# Patient Record
Sex: MALE | Race: BLACK OR AFRICAN AMERICAN | Employment: FULL TIME | URBAN - METROPOLITAN AREA
[De-identification: names, ages, dates, MRNs, and addresses within clinical notes are randomized per-mention and may not be internally consistent; named-entity substitution may affect disease eponyms.]

---

## 2021-06-12 ENCOUNTER — HOSPITAL ENCOUNTER (EMERGENCY)
Age: 26
Discharge: LEFT BEFORE DISCHARGE | End: 2021-06-12

## 2021-06-12 VITALS
RESPIRATION RATE: 19 BRPM | DIASTOLIC BLOOD PRESSURE: 90 MMHG | TEMPERATURE: 98.5 F | SYSTOLIC BLOOD PRESSURE: 140 MMHG | OXYGEN SATURATION: 99 % | HEART RATE: 120 BPM

## 2021-06-12 ASSESSMENT — PAIN SCALES - GENERAL: PAINLEVEL_OUTOF10: 7

## 2021-07-18 ENCOUNTER — HOSPITAL ENCOUNTER (EMERGENCY)
Age: 26
Discharge: HOME | End: 2021-07-18
Attending: EMERGENCY MEDICINE

## 2021-07-18 VITALS
HEART RATE: 68 BPM | BODY MASS INDEX: 25.92 KG/M2 | TEMPERATURE: 98.1 F | DIASTOLIC BLOOD PRESSURE: 75 MMHG | HEIGHT: 69 IN | OXYGEN SATURATION: 98 % | WEIGHT: 175 LBS | RESPIRATION RATE: 18 BRPM | SYSTOLIC BLOOD PRESSURE: 118 MMHG

## 2021-07-18 DIAGNOSIS — S61.411A LACERATION OF RIGHT HAND WITHOUT FOREIGN BODY, INITIAL ENCOUNTER: Primary | ICD-10-CM

## 2021-07-18 PROCEDURE — 99283 EMERGENCY DEPT VISIT LOW MDM: CPT

## 2021-07-18 ASSESSMENT — ENCOUNTER SYMPTOMS
COUGH: 0
SHORTNESS OF BREATH: 0
VOMITING: 0
SORE THROAT: 0
RHINORRHEA: 0
ABDOMINAL PAIN: 0
BACK PAIN: 0

## 2021-07-18 NOTE — ED PROVIDER NOTES
[facility]  ED  Emergency Department Encounter  Emergency Medicine Resident     Pt Name: Mark Villatoro  MRN: [id number]  [place] 1995  Date of evaluation: 7/18/21  PCP:  No primary care provider on file. CHIEF COMPLAINT       Chief Complaint   Patient presents with    Laceration     right hand, utd on tetnus per pt, caught hand on nail in door        HISTORY OFPRESENT ILLNESS  (Location/Symptom, Timing/Onset, Context/Setting, Quality, Duration, Modifying Factors,Severity.)      Mark Villatoro is a 27 y.o. male with no significant past medical history who presents with laceration to his right hand that occurred when he was taking his dog outside. He states he cut his hand on a nail in the doorway. Patient reported a significant amount of bleeding initially but has controlled the bleeding now. He denies any other trauma or injuries. He denies any other symptoms. His tetanus is up-to-date as of last year. PAST MEDICAL / SURGICAL / SOCIAL / FAMILY HISTORY     Patient denies past medical or surgical history    Social:  reports that he has been smoking cigarettes. He has been smoking about 1.00 pack per day. He has never used smokeless tobacco. He reports previous alcohol use. He reports current drug use. Frequency: 7.00 times per week. Drug: Marijuana. Family Hx: History reviewed. No pertinent family history. Allergies:  Patient has no known allergies. Home Medications:  Prior to Admission medications    Not on File       REVIEW OFSYSTEMS    (2-9 systems for level 4, 10 or more for level 5)      Review of Systems   Constitutional: Negative for chills and fever. HENT: Negative for congestion, rhinorrhea and sore throat. Respiratory: Negative for cough and shortness of breath. Cardiovascular: Negative for chest pain and leg swelling. Gastrointestinal: Negative for abdominal pain and vomiting. Genitourinary: Negative for decreased urine volume and hematuria. Musculoskeletal: Negative for arthralgias, back pain and neck pain. Skin: Positive for wound. Negative for rash. Neurological: Negative for dizziness, numbness and headaches. All other systems reviewed and are negative. PHYSICAL EXAM   (up to 7 for level 4, 8 or more forlevel 5)      INITIAL VITALS:   Vitals:    07/18/21 1405 07/18/21 1409   BP:  118/75   Pulse: 68    Resp: 18    Temp: 98.1 °F (36.7 °C)    TempSrc: Oral    SpO2: 98%    Weight: 175 lb (79.4 kg)    Height: 5' 9\" (1.753 m)         Physical Exam  Constitutional:       General: He is not in acute distress. Appearance: He is not toxic-appearing. HENT:      Head: Normocephalic and atraumatic. Nose: Nose normal.      Mouth/Throat:      Mouth: Mucous membranes are moist.   Eyes:      Pupils: Pupils are equal, round, and reactive to light. Cardiovascular:      Rate and Rhythm: Normal rate and regular rhythm. Pulmonary:      Effort: Pulmonary effort is normal. No respiratory distress. Breath sounds: Normal breath sounds. Abdominal:      General: Abdomen is flat. Musculoskeletal:         General: Normal range of motion. Cervical back: Neck supple. No rigidity. Skin:     General: Skin is warm and dry. Comments: 4 cm linear laceration to the ulnar aspect of the right lateral, hand. The most distal portion of the wound is superficial and does not require repair. The more proximal area could benefit from closure. There is no active bleeding. Patient is neurovascularly intact. Neurological:      General: No focal deficit present. Mental Status: He is alert. Psychiatric:         Mood and Affect: Mood normal.         Behavior: Behavior normal.         DIFFERENTIAL  DIAGNOSIS     DDX: Laceration    Initial MDM/Plan: 27 y.o. male with no significant past medical history who presents with a laceration to the ulnar aspect of his right palmar hand after getting it caught on a nail in the door.   On physical exam, patient is awake alert, well-appearing. His vitals are unremarkable. He has a linear laceration to palmar aspect of the right lateral hand with no active bleeding. The most proximal portion is somewhat open and would benefit from closure. Will wash patient's wound with povidone iodine and saline and closed with Dermabond and Steri-Strips. Patient states his tetanus is up-to-date. DIAGNOSTIC RESULTS / EMERGENCYDEPARTMENT COURSE / MDM     LABS:  No results found for this visit on 07/18/21. RADIOLOGY:  No results found. EKG  None      MEDICATIONS ORDERED:  No orders of the defined types were placed in this encounter. PROCEDURES:  PROCEDURE NOTE - LACERATION CLOSURE    PATIENT NAME: Aloma Soulier  MEDICAL RECORD NO. [id number]  DATE: 7/18/2021  ATTENDING PHYSICIAN: Dr. Rod Bernstein DIAGNOSIS: Laceration(s) as follows:  -Location: Right lateral hand on the palmar aspect  -Length: 4 cm  -Layered closure: No    POSTOPERATIVE DIAGNOSIS:  Same  PROCEDURE PERFORMED:  Suture closure of laceration  PERFORMING PHYSICIAN: Vesta Daniels DO  ANESTHESIA:  Local utilizing  not required  ESTIMATED BLOOD LOSS:  Less than 25 ml. DISCUSSION:  Aloma Soulier is a 27y.o.-year-old male. Patient requires laceration repair. The history and physical examination were reviewed and confirmed. CONSENT: The patient provided verbal consent for this procedure. PROCEDURE:  Prior to starting, the procedure and patient were confirmed by those present. The wound area was irrigated with sterile saline, cleansed with povidone iodine and draped in a sterile fashion. The wound area was anesthetized with not required. The wound was explored with the following results No foreign bodies found. The wound was repaired with Dermabond. The wound was dressed with a bandage and steristrips. All sponge, instrument and needle counts were correct at the completion of the procedure.       The patient tolerated the

## 2021-07-18 NOTE — ED PROVIDER NOTES
[address] [facility] [place]     Emergency Department     Faculty Attestation    I performed a history and physical examination of the patient and discussed management with the resident. I reviewed the resident´s note and agree with the documented findings and plan of care. Any areas of disagreement are noted on the chart. I was personally present for the key portions of any procedures. I have documented in the chart those procedures where I was not present during the key portions. I have reviewed the emergency nurses triage note. I agree with the chief complaint, past medical history, past surgical history, allergies, medications, social and family history as documented unless otherwise noted below. For Physician Assistant/ Nurse Practitioner cases/documentation I have personally evaluated this patient and have completed at least one if not all key elements of the E/M (history, physical exam, and MDM). Additional findings are as noted. Right-hand-dominant, superficial laceration ulnar aspect of the right palm. Tetanus up-to-date.      Poonam Whitney MD  07/18/21 0226

## 2021-07-18 NOTE — ED NOTES
Pt presents to the ED with c/o of hand laceration. Pt states he was taking out the garbage and his hand caught a nail in the door. Pt states he is utd on his tetnus. Pt states he did not take anything for pain and is not currently in pain. Pt has 3cm laceration to medial edge of right hand.    Pt denies other c/o  Call Light Given, White board updated      Edy Kang RN  07/18/21 1954

## 2021-08-04 PROCEDURE — 99283 EMERGENCY DEPT VISIT LOW MDM: CPT

## 2021-08-04 PROCEDURE — 96374 THER/PROPH/DIAG INJ IV PUSH: CPT

## 2021-08-04 PROCEDURE — 96375 TX/PRO/DX INJ NEW DRUG ADDON: CPT

## 2021-08-05 ENCOUNTER — HOSPITAL ENCOUNTER (EMERGENCY)
Age: 26
Discharge: HOME | End: 2021-08-05
Attending: EMERGENCY MEDICINE

## 2021-08-05 VITALS
HEART RATE: 64 BPM | TEMPERATURE: 98.2 F | BODY MASS INDEX: 25.92 KG/M2 | DIASTOLIC BLOOD PRESSURE: 68 MMHG | HEIGHT: 69 IN | SYSTOLIC BLOOD PRESSURE: 108 MMHG | RESPIRATION RATE: 14 BRPM | WEIGHT: 175 LBS | OXYGEN SATURATION: 97 %

## 2021-08-05 DIAGNOSIS — R11.2 NAUSEA AND VOMITING, INTRACTABILITY OF VOMITING NOT SPECIFIED, UNSPECIFIED VOMITING TYPE: ICD-10-CM

## 2021-08-05 DIAGNOSIS — R52 BODY ACHES: Primary | ICD-10-CM

## 2021-08-05 LAB
ABSOLUTE EOS #: 0.24 K/UL (ref 0–0.4)
ABSOLUTE IMMATURE GRANULOCYTE: 0 K/UL (ref 0–0.3)
ABSOLUTE LYMPH #: 4.72 K/UL (ref 1–4.8)
ABSOLUTE MONO #: 0.97 K/UL (ref 0.1–0.8)
ALBUMIN SERPL-MCNC: 3.9 G/DL (ref 3.5–5.2)
ALBUMIN/GLOBULIN RATIO: 1.3 (ref 1–2.5)
ALP BLD-CCNC: 124 U/L (ref 40–129)
ALT SERPL-CCNC: 16 U/L (ref 5–41)
ANION GAP SERPL CALCULATED.3IONS-SCNC: 12 MMOL/L (ref 9–17)
AST SERPL-CCNC: 17 U/L
BASOPHILS # BLD: 0 % (ref 0–2)
BASOPHILS ABSOLUTE: 0 K/UL (ref 0–0.2)
BILIRUB SERPL-MCNC: 0.37 MG/DL (ref 0.3–1.2)
BILIRUBIN DIRECT: 0.11 MG/DL
BILIRUBIN, INDIRECT: 0.26 MG/DL (ref 0–1)
BUN BLDV-MCNC: 13 MG/DL (ref 6–20)
BUN/CREAT BLD: ABNORMAL (ref 9–20)
CALCIUM SERPL-MCNC: 8.5 MG/DL (ref 8.6–10.4)
CHLORIDE BLD-SCNC: 99 MMOL/L (ref 98–107)
CO2: 24 MMOL/L (ref 20–31)
CREAT SERPL-MCNC: 1.19 MG/DL (ref 0.7–1.2)
DIFFERENTIAL TYPE: ABNORMAL
EOSINOPHILS RELATIVE PERCENT: 2 % (ref 1–4)
GFR AFRICAN AMERICAN: >60 ML/MIN
GFR NON-AFRICAN AMERICAN: >60 ML/MIN
GFR SERPL CREATININE-BSD FRML MDRD: ABNORMAL ML/MIN/{1.73_M2}
GFR SERPL CREATININE-BSD FRML MDRD: ABNORMAL ML/MIN/{1.73_M2}
GLOBULIN: NORMAL G/DL (ref 1.5–3.8)
GLUCOSE BLD-MCNC: 106 MG/DL (ref 70–99)
HCT VFR BLD CALC: 41.3 % (ref 40.7–50.3)
HEMOGLOBIN: 13.5 G/DL (ref 13–17)
IMMATURE GRANULOCYTES: 0 %
LIPASE: 17 U/L (ref 13–60)
LYMPHOCYTES # BLD: 39 % (ref 24–44)
MCH RBC QN AUTO: 29.5 PG (ref 25.2–33.5)
MCHC RBC AUTO-ENTMCNC: 32.7 G/DL (ref 28.4–34.8)
MCV RBC AUTO: 90.2 FL (ref 82.6–102.9)
MONOCYTES # BLD: 8 % (ref 1–7)
MORPHOLOGY: NORMAL
NRBC AUTOMATED: 0 PER 100 WBC
PDW BLD-RTO: 13.3 % (ref 11.8–14.4)
PLATELET # BLD: 288 K/UL (ref 138–453)
PLATELET ESTIMATE: ABNORMAL
PMV BLD AUTO: 10.1 FL (ref 8.1–13.5)
POTASSIUM SERPL-SCNC: 3.7 MMOL/L (ref 3.7–5.3)
RBC # BLD: 4.58 M/UL (ref 4.21–5.77)
RBC # BLD: ABNORMAL 10*6/UL
SEG NEUTROPHILS: 51 % (ref 36–66)
SEGMENTED NEUTROPHILS ABSOLUTE COUNT: 6.17 K/UL (ref 1.8–7.7)
SODIUM BLD-SCNC: 135 MMOL/L (ref 135–144)
TOTAL PROTEIN: 6.8 G/DL (ref 6.4–8.3)
WBC # BLD: 12.1 K/UL (ref 3.5–11.3)
WBC # BLD: ABNORMAL 10*3/UL

## 2021-08-05 PROCEDURE — 96361 HYDRATE IV INFUSION ADD-ON: CPT

## 2021-08-05 PROCEDURE — 83690 ASSAY OF LIPASE: CPT

## 2021-08-05 PROCEDURE — 2580000003 HC RX 258: Performed by: STUDENT IN AN ORGANIZED HEALTH CARE EDUCATION/TRAINING PROGRAM

## 2021-08-05 PROCEDURE — 80076 HEPATIC FUNCTION PANEL: CPT

## 2021-08-05 PROCEDURE — 96374 THER/PROPH/DIAG INJ IV PUSH: CPT

## 2021-08-05 PROCEDURE — 6360000002 HC RX W HCPCS: Performed by: STUDENT IN AN ORGANIZED HEALTH CARE EDUCATION/TRAINING PROGRAM

## 2021-08-05 PROCEDURE — 80048 BASIC METABOLIC PNL TOTAL CA: CPT

## 2021-08-05 PROCEDURE — 85025 COMPLETE CBC W/AUTO DIFF WBC: CPT

## 2021-08-05 PROCEDURE — 96375 TX/PRO/DX INJ NEW DRUG ADDON: CPT

## 2021-08-05 RX ORDER — SODIUM CHLORIDE, SODIUM LACTATE, POTASSIUM CHLORIDE, CALCIUM CHLORIDE 600; 310; 30; 20 MG/100ML; MG/100ML; MG/100ML; MG/100ML
1000 INJECTION, SOLUTION INTRAVENOUS ONCE
Status: COMPLETED | OUTPATIENT
Start: 2021-08-05 | End: 2021-08-05

## 2021-08-05 RX ORDER — ONDANSETRON 2 MG/ML
4 INJECTION INTRAMUSCULAR; INTRAVENOUS ONCE
Status: COMPLETED | OUTPATIENT
Start: 2021-08-05 | End: 2021-08-05

## 2021-08-05 RX ORDER — ONDANSETRON 4 MG/1
4 TABLET, ORALLY DISINTEGRATING ORAL EVERY 8 HOURS PRN
Qty: 10 TABLET | Refills: 0 | Status: SHIPPED | OUTPATIENT
Start: 2021-08-05 | End: 2021-08-08

## 2021-08-05 RX ORDER — MONTELUKAST SODIUM 10 MG/1
10 TABLET ORAL NIGHTLY
COMMUNITY

## 2021-08-05 RX ORDER — KETOROLAC TROMETHAMINE 15 MG/ML
15 INJECTION, SOLUTION INTRAMUSCULAR; INTRAVENOUS ONCE
Status: COMPLETED | OUTPATIENT
Start: 2021-08-05 | End: 2021-08-05

## 2021-08-05 RX ADMIN — KETOROLAC TROMETHAMINE 15 MG: 15 INJECTION, SOLUTION INTRAMUSCULAR; INTRAVENOUS at 01:00

## 2021-08-05 RX ADMIN — SODIUM CHLORIDE, POTASSIUM CHLORIDE, SODIUM LACTATE AND CALCIUM CHLORIDE 1000 ML: 600; 310; 30; 20 INJECTION, SOLUTION INTRAVENOUS at 01:00

## 2021-08-05 RX ADMIN — ONDANSETRON 4 MG: 2 INJECTION INTRAMUSCULAR; INTRAVENOUS at 01:00

## 2021-08-05 ASSESSMENT — ENCOUNTER SYMPTOMS
SHORTNESS OF BREATH: 0
VOMITING: 1
CONSTIPATION: 0
EYE PAIN: 0
COUGH: 0
DIARRHEA: 1
NAUSEA: 1
WHEEZING: 0
RHINORRHEA: 0
EYE DISCHARGE: 0
ABDOMINAL PAIN: 1

## 2021-08-05 ASSESSMENT — PAIN SCALES - GENERAL
PAINLEVEL_OUTOF10: 9
PAINLEVEL_OUTOF10: 9

## 2021-08-05 ASSESSMENT — PAIN DESCRIPTION - LOCATION: LOCATION: LEG;BACK

## 2021-08-05 ASSESSMENT — PAIN DESCRIPTION - FREQUENCY: FREQUENCY: CONTINUOUS

## 2021-08-05 ASSESSMENT — PAIN DESCRIPTION - DESCRIPTORS: DESCRIPTORS: ACHING

## 2021-08-05 NOTE — ED TRIAGE NOTES
Pt came to ED with c/o generalized body aches, N/V/D x3 days. Pt stated he has not been able to keep anything down. Pt reports he has had both COVID vaccinations. Pt denies being in contact with anyone with COVID. Pt has been having hot and cold spells.

## 2021-08-05 NOTE — ED PROVIDER NOTES
[facility] [facility]     Emergency Department     Faculty Attestation        I performed a history and physical examination of the patient and discussed management with the resident. I reviewed the residents note and agree with the documented findings and plan of care. Any areas of disagreement are noted on the chart. I was personally present for the key portions of any procedures. I have documented in the chart those procedures where I was not present during the key portions. I have reviewed the emergency nurses triage note. I agree with the chief complaint, past medical history, past surgical history, allergies, medications, social and family history as documented unless otherwise noted below. For Physician Assistant/ Nurse Practitioner cases/documentation I have personally evaluated this patient and have completed at least one if not all key elements of the E/M (history, physical exam, and MDM). Additional findings are as noted. Vital Signs: BP: 108/68  Pulse: 93  Resp: 20  Temp: 100.2 °F (37.9 °C) SpO2: 98 %  PCP:  No primary care provider on file. Pertinent Comments:         Critical Care  None    This patient was evaluated in the Emergency Department for symptoms described in the history of present illness. He/she was evaluated in the context of the global COVID-19 pandemic, which necessitated consideration that the patient might be at risk for infection with the SARS-CoV-2 virus that causes COVID-19. Institutional protocols and algorithms that pertain to the evaluation of patients at risk for COVID-19 are in a state of rapid change based on information released by regulatory bodies including the CDC and federal and state organizations. These policies and algorithms were followed during the patient's care in the ED.     (Please note that portions of this note were completed with a voice recognition program. Efforts were made to edit the dictations but occasionally words are mis-transcribed.  Whenever words are used in this note in any gender, they shall be construed as though they were used in the gender appropriate to the circumstances; and whenever words are used in this note in the singular or plural form, they shall be construed as though they were used in the form appropriate to the circumstances.)    MD Natalie Salinas  Attending Emergency Medicine Physician             Saeed Almeida MD  08/05/21 0619

## 2021-08-05 NOTE — ED PROVIDER NOTES
[facility] ED  Emergency Department Encounter  Emergency Medicine Resident     Pt Name: Nicanor Burden  MRN: [id number]  [place] 1995  Date of evaluation: 8/5/21  PCP:  No primary care provider on file. CHIEF COMPLAINT       Chief Complaint   Patient presents with    Generalized Body Aches       HISTORY OFPRESENT ILLNESS  (Location/Symptom, Timing/Onset, Context/Setting, Quality, Duration, Modifying Ryan Button.)      Nicanor Burden is a 27 y.o. male who presents with 3 days of worsening nausea, vomiting, abdominal pain, feeling unwell, generalized body aches and diarrhea. Patient denies any sick contacts. Has been noticing against Covid. Is up-to-date on vaccinations otherwise. Is otherwise healthy, no home medications. States that he has just been feeling unwell for the past 3 days. Has been unable to eat or drink and today was feeling weak and so he came to the emergency department. He denies any chest pain or shortness of breath, cough, congestion. Does have mild headache. Has been trying over-the-counter medications with minimal relief. Denies any blood in his vomit or stool. Is any trauma to the abdomen or head. Has been urinating no difficulty, no pain with urination. Moderate in severity    PAST MEDICAL / SURGICAL / SOCIAL / FAMILY HISTORY      has no past medical history on file. has no past surgical history on file. Social:  reports that he has been smoking cigarettes. He has been smoking about 1.00 pack per day. He has never used smokeless tobacco. He reports previous alcohol use. He reports current drug use. Frequency: 7.00 times per week. Drug: Marijuana. Family Hx: History reviewed. No pertinent family history. Allergies:  Patient has no known allergies. Home Medications:  Prior to Admission medications    Medication Sig Start Date End Date Taking?  Authorizing Provider   montelukast (SINGULAIR) 10 MG tablet Take 10 mg by mouth nightly Yes Historical Provider, MD   ondansetron (ZOFRAN ODT) 4 MG disintegrating tablet Place 1 tablet under the tongue every 8 hours as needed for Nausea 8/5/21 8/8/21 Yes Sherry Sheets, DO       REVIEW OFSYSTEMS    (2-9 systems for level 4, 10 or more for level 5)      Review of Systems   Constitutional: Positive for appetite change, chills, fatigue and fever. HENT: Negative for congestion, ear pain and rhinorrhea. Eyes: Negative for pain and discharge. Respiratory: Negative for cough, shortness of breath and wheezing. Cardiovascular: Negative for chest pain and palpitations. Gastrointestinal: Positive for abdominal pain (left sided), diarrhea, nausea and vomiting. Negative for constipation. Genitourinary: Negative for difficulty urinating and hematuria. Musculoskeletal: Negative for arthralgias and myalgias. Skin: Negative for rash and wound. Neurological: Negative for dizziness, tremors, seizures, syncope, weakness, light-headedness and headaches. PHYSICAL EXAM   (up to 7 for level 4, 8 or more forlevel 5)      INITIAL VITALS:   Vitals:    08/05/21 0159   BP:    Pulse: 64   Resp: 14   Temp: 98.2 °F (36.8 °C)   SpO2: 97%        Physical Exam  Vitals and nursing note reviewed. Constitutional:       General: He is not in acute distress. Appearance: Normal appearance. He is not ill-appearing. HENT:      Head: Normocephalic and atraumatic. Nose: Nose normal. No congestion or rhinorrhea. Mouth/Throat:      Mouth: Mucous membranes are moist.      Pharynx: Oropharynx is clear. No oropharyngeal exudate or posterior oropharyngeal erythema. Eyes:      General:         Right eye: No discharge. Left eye: No discharge. Extraocular Movements: Extraocular movements intact. Pupils: Pupils are equal, round, and reactive to light. Cardiovascular:      Rate and Rhythm: Normal rate and regular rhythm. Pulses: Normal pulses. Heart sounds: No murmur heard. No friction rub. No gallop. Comments: Pulses palpable and equal at bilateral radial, 2+. Pulmonary:      Effort: Pulmonary effort is normal.      Breath sounds: Normal breath sounds. No wheezing. Abdominal:      General: Abdomen is flat. Palpations: Abdomen is soft. Tenderness: There is abdominal tenderness. There is no guarding or rebound. Comments: Soft, nondistened, nonperitoneal, no surgical scars noted   Musculoskeletal:      Cervical back: Normal range of motion. No rigidity. Right lower leg: No edema. Left lower leg: No edema. Comments: Ambulated into the emergency department with steady gait.  strength intact and equal bilateral upper extremities, able to wiggle toes bilateral lower extremities, dorsiflexion plantarflexion intact and equal bilateral lower extremities. Skin:     General: Skin is warm and dry. Capillary Refill: Capillary refill takes less than 2 seconds. Neurological:      General: No focal deficit present. Mental Status: He is alert and oriented to person, place, and time. Motor: No weakness. Comments: Alert, Oriented, answering all questions appropriately, GCS is 15. Psychiatric:         Mood and Affect: Mood normal.         Behavior: Behavior normal.         Thought Content: Thought content normal.         DIFFERENTIAL  DIAGNOSIS         Initial MDM/Plan: 27 y.o. male who presents with 3 days worsening of nausea, vomiting, abdominal pain and diarrhea. Initial examination patient is febrile although does not appear in any distress, no respiratory distress, speaking in full sentences, alert, oriented, answering questions appropriately, patient is non-lethargic. Physical exam is grossly unremarkable, abdomen is soft, nondistended, nonperitoneal.  Will plan for CBC, BMP, hepatic function, lipase to ensure there is no other intra-abdominal abnormalities. IV fluids, Zofran, Toradol for symptomatic relief.   Patient feeling much better after interventions. CBC showing no acute abnormalities, suspect that mildly elevated white blood cell count is due to stress reaction from multiple days of vomiting, hemoglobin is within normal limits. No significant left shift. Electrolytes are within normal limits. No indications of hyperglycemia or hypoglycemia. Lipase and hepatic function within normal limits. Patient feeling better. Patient discharged in stable condition after meeting vitally stable throughout emergency department stay. Did give patient information to follow-up with [facility] to establish care for medical maintenance therapy, strict return precautions provided. Patient discharged in stable condition after being vitally stable throughout emergency department stay. Patient complaining understanding of this, patient expressed understanding. DIAGNOSTIC RESULTS / EMERGENCYDEPARTMENT COURSE / MDM     LABS:  Labs Reviewed   CBC WITH AUTO DIFFERENTIAL - Abnormal; Notable for the following components:       Result Value    WBC 12.1 (*)     Monocytes 8 (*)     Absolute Mono # 0.97 (*)     All other components within normal limits   BASIC METABOLIC PANEL W/ REFLEX TO MG FOR LOW K - Abnormal; Notable for the following components:    Glucose 106 (*)     Calcium 8.5 (*)     All other components within normal limits   HEPATIC FUNCTION PANEL   LIPASE         RADIOLOGY:  No results found. EKG      All EKG's are interpreted by the Emergency Department Physicianwho either signs or Co-signs this chart in the absence of a cardiologist.    EMERGENCY DEPARTMENT COURSE:          PROCEDURES:  None    CONSULTS:  None      FINAL IMPRESSION      1. Body aches    2.  Nausea and vomiting, intractability of vomiting not specified, unspecified vomiting type          DISPOSITION / PLAN     DISPOSITION Decision To Discharge 08/05/2021 02:45:41 AM      PATIENT REFERRED TO:  [facility] [address]  [address] [place] [address] [address] [facility] [place] [address] [phone]    As needed, If symptoms worsen    [facility]  [address] [place] [address]  [phone]    As needed, If symptoms worsen      DISCHARGE MEDICATIONS:  New Prescriptions    ONDANSETRON (ZOFRAN ODT) 4 MG DISINTEGRATING TABLET    Place 1 tablet under the tongue every 8 hours as needed for Nausea       Wilma Robles DO  Emergency Medicine Resident    (Please note that portions of this note were completed with a voice recognition program.Efforts were made to edit the dictations but occasionally words are mis-transcribed.)       Wilma Robles DO  Resident  08/05/21 0041

## 2021-08-07 ENCOUNTER — HOSPITAL ENCOUNTER (EMERGENCY)
Age: 26
Discharge: HOME | End: 2021-08-07
Attending: EMERGENCY MEDICINE

## 2021-08-07 VITALS
RESPIRATION RATE: 16 BRPM | SYSTOLIC BLOOD PRESSURE: 139 MMHG | OXYGEN SATURATION: 98 % | DIASTOLIC BLOOD PRESSURE: 78 MMHG | TEMPERATURE: 98.3 F | HEART RATE: 72 BPM

## 2021-08-07 DIAGNOSIS — R11.2 NON-INTRACTABLE VOMITING WITH NAUSEA, UNSPECIFIED VOMITING TYPE: Primary | ICD-10-CM

## 2021-08-07 PROCEDURE — 6360000002 HC RX W HCPCS

## 2021-08-07 PROCEDURE — 96372 THER/PROPH/DIAG INJ SC/IM: CPT

## 2021-08-07 PROCEDURE — 99284 EMERGENCY DEPT VISIT MOD MDM: CPT

## 2021-08-07 RX ORDER — PROMETHAZINE HYDROCHLORIDE 25 MG/ML
6.25 INJECTION, SOLUTION INTRAMUSCULAR; INTRAVENOUS ONCE
Status: COMPLETED | OUTPATIENT
Start: 2021-08-07 | End: 2021-08-07

## 2021-08-07 RX ADMIN — PROMETHAZINE HYDROCHLORIDE 6.25 MG: 25 INJECTION INTRAMUSCULAR; INTRAVENOUS at 14:28

## 2021-08-07 ASSESSMENT — ENCOUNTER SYMPTOMS
VOMITING: 1
NAUSEA: 1
ABDOMINAL PAIN: 1
SHORTNESS OF BREATH: 0
SORE THROAT: 0
WHEEZING: 0
DIARRHEA: 1
COUGH: 1

## 2021-08-07 ASSESSMENT — PAIN SCALES - GENERAL: PAINLEVEL_OUTOF10: 8

## 2021-08-07 ASSESSMENT — PAIN DESCRIPTION - ONSET: ONSET: ON-GOING

## 2021-08-07 ASSESSMENT — PAIN DESCRIPTION - DESCRIPTORS: DESCRIPTORS: ACHING;SHARP

## 2021-08-07 ASSESSMENT — PAIN DESCRIPTION - FREQUENCY: FREQUENCY: CONTINUOUS

## 2021-08-07 ASSESSMENT — PAIN DESCRIPTION - LOCATION: LOCATION: GENERALIZED

## 2021-08-07 ASSESSMENT — PAIN DESCRIPTION - PAIN TYPE: TYPE: ACUTE PAIN

## 2021-08-07 ASSESSMENT — PAIN DESCRIPTION - PROGRESSION: CLINICAL_PROGRESSION: NOT CHANGED

## 2021-08-07 NOTE — ED PROVIDER NOTES
[facility] ED  Emergency Department Encounter  Non Emergency Medicine Resident     Pt Name: Radha Morris  MRN: [id number]  [place] 1995  Date of evaluation: 8/7/21  PCP:  No primary care provider on file. CHIEF COMPLAINT       Chief Complaint   Patient presents with    Emesis     3 times a day     Generalized Body Aches    Cough     mucus    Leg Pain     right leg randomly gives out per patient       HISTORY OF PRESENT ILLNESS  (Location/Symptom, Timing/Onset, Context/Setting,Quality, Duration, Modifying Factors, Severity.)      Radha Morris is a 27 y.o. male who presents with vomiting and abdominal pain that started 3 days ago. He reported that symptoms started with multiple episodes of nausea,vomiting, Left lower quadrant pain, and diarrhea. Patient also reported productive cough, right lower limb weakness and decreased sensation. Patient came to the ED 2 days ago with the same complaints and he was given Zofran. He said that he smokes weed daily, no use of other recreational drugs. Patient was diagnosed with Covid in May 21. He received both doses of Covid vaccine in June. PAST MEDICAL / SURGICAL /SOCIAL / FAMILY HISTORY      has no past medical history on file. has no past surgical history on file.     Social History     Socioeconomic History    Marital status: Single     Spouse name: Not on file    Number of children: Not on file    Years of education: Not on file    Highest education level: Not on file   Occupational History    Not on file   Tobacco Use    Smoking status: Current Every Day Smoker     Packs/day: 1.00     Types: Cigarettes    Smokeless tobacco: Never Used   Vaping Use    Vaping Use: Never used   Substance and Sexual Activity    Alcohol use: Not Currently    Drug use: Yes     Frequency: 7.0 times per week     Types: Marijuana    Sexual activity: Not on file   Other Topics Concern    Not on file   Social History Narrative    Not on file Social Determinants of Health     Financial Resource Strain:     Difficulty of Paying Living Expenses:    Food Insecurity:     Worried About Running Out of Food in the Last Year:     [address] in the Last Year:    Transportation Needs:     Lack of Transportation (Medical):  Lack of Transportation (Non-Medical):    Physical Activity:     Days of Exercise per Week:     Minutes of Exercise per Session:    Stress:     Feeling of Stress :    Social Connections:     Frequency of Communication with Friends and Family:     Frequency of Social Gatherings with Friends and Family:     Attends [employer] Services:     Active Member of Clubs or Organizations:     Attends Club or Organization Meetings:     Marital Status:    Intimate Partner Violence:     Fear of Current or Ex-Partner:     Emotionally Abused:     Physically Abused:     Sexually Abused:        History reviewed. No pertinent family history. Allergies:  Patient has no known allergies. Home Medications:  Prior to Admission medications    Medication Sig Start Date End Date Taking? Authorizing Provider   montelukast (SINGULAIR) 10 MG tablet Take 10 mg by mouth nightly    Historical Provider, MD   ondansetron (ZOFRAN ODT) 4 MG disintegrating tablet Place 1 tablet under the tongue every 8 hours as needed for Nausea 8/5/21 8/8/21  Radha Vega, DO       REVIEW OF SYSTEMS    (2-9 systems for level 4, 10 or more for level 5)      Review of Systems   Constitutional: Negative for fatigue and fever. HENT: Negative for congestion and sore throat. Respiratory: Positive for cough. Negative for shortness of breath and wheezing. Cardiovascular: Negative for chest pain, palpitations and leg swelling. Gastrointestinal: Positive for abdominal pain, diarrhea, nausea and vomiting. LLQ pain   Genitourinary: Negative for dysuria and frequency. Musculoskeletal: Negative. Skin: Negative.     Neurological: Negative for dizziness, light-headedness and headaches. Right lower limb weakness and abnormal sensation    Psychiatric/Behavioral: Negative for agitation and confusion. PHYSICAL EXAM   (up to 7for level 4, 8 or more for level 5)      INITIAL VITALS:   /78   Pulse 72   Temp 98.3 °F (36.8 °C) (Oral)   Resp 16   SpO2 98%     Physical Exam  Constitutional:       General: He is not in acute distress. Appearance: Normal appearance. HENT:      Head: Normocephalic and atraumatic. Nose: Nose normal.      Mouth/Throat:      Mouth: Mucous membranes are moist.   Eyes:      Extraocular Movements: Extraocular movements intact. Conjunctiva/sclera: Conjunctivae normal.      Pupils: Pupils are equal, round, and reactive to light. Cardiovascular:      Rate and Rhythm: Normal rate and regular rhythm. Pulses: Normal pulses. Heart sounds: Normal heart sounds. No murmur heard. Pulmonary:      Effort: Pulmonary effort is normal. No respiratory distress. Breath sounds: Normal breath sounds. No wheezing, rhonchi or rales. Abdominal:      General: Abdomen is flat. Bowel sounds are normal. There is no distension. Palpations: Abdomen is soft. Tenderness: There is no abdominal tenderness. Comments: LLQ tenderness    Skin:     General: Skin is warm. Neurological:      General: No focal deficit present. Mental Status: He is alert and oriented to person, place, and time. Motor: No weakness. Comments: Right lower limb sensory deficits    Psychiatric:         Mood and Affect: Mood normal.         DIFFERENTIAL  DIAGNOSIS     PLAN (LABS / IMAGING / EKG):  No orders of the defined types were placed in this encounter.       MEDICATIONSORDERED:  Orders Placed This Encounter   Medications    promethazine (PHENERGAN) injection 6.25 mg       DDX: nausea and vomiting     DIAGNOSTIC RESULTS / EMERGENCY DEPARTMENT COURSE / MDM     LABS:  No results found for this visit on 08/07/21. IMPRESSION:     RADIOLOGY:  None    EKG  None    All EKG's are interpreted by the Emergency Department Physician who either signs or Co-signsthis chart in the absence of a cardiologist.    EMERGENCY DEPARTMENT COURSE:  Patient received phenergan IM injection one time. Will be discharged. Advised to follow up with PCP. PROCEDURES:  None    CONSULTS:  None    CRITICAL CARE:  None    FINAL IMPRESSION      No diagnosis found. DISPOSITION / PLAN     DISPOSITION    Home    PATIENT REFERRED TO:  No follow-up provider specified.     DISCHARGE MEDICATIONS:  New Prescriptions    No medications on file       Shadi Veloz MD  [address] [facility]  Non-emergency medicine resident     Shadi Veloz MD  Resident  08/07/21 [id number]

## 2021-08-07 NOTE — ED PROVIDER NOTES
[address] [facility]     Emergency Department     Faculty Attestation    I performed a history and physical examination of the patient and discussed management with the resident. I have reviewed and agree with the residents findings including all diagnostic interpretations, and treatment plans as written at the time of my review. Any areas of disagreement are noted on the chart. I was personally present for the key portions of any procedures. I have documented in the chart those procedures where I was not present during the key portions. For Physician Assistant/ Nurse Practitioner cases/documentation I have personally evaluated this patient and have completed at least one if not all key elements of the E/M (history, physical exam, and MDM). Additional findings are as noted. This patient was evaluated in the Emergency Department for symptoms described in the history of present illness. The patient was evaluated in the context of the global COVID-19 pandemic, which necessitated consideration that the patient might be at risk for infection with the SARS-CoV-2 virus that causes COVID-19. Institutional protocols and algorithms that pertain to the evaluation of patients at risk for COVID-19 are in a state of rapid change based on information released by regulatory bodies including the CDC and federal and state organizations. These policies and algorithms were followed during the patient's care in the ED. Primary Care Physician: No primary care provider on file. History: This is a 27 y.o. male who presents to the Emergency Department with complaint of loss of appetite headache generalized weakness leg pain and cough. Patient was seen 2 days ago for similar complaint. Patient has unremarkable CBC LFTs and a BMP at that time. The patient was given prescription for Zofran. He also complains that he has decreased sensation in his left leg. This is his entire leg.

## 2021-08-07 NOTE — ED NOTES
Patient to ed from home, c/o ongoing cough, right leg buckling, generalized weakness and pain with vomiting x3 times daily. Patient is aox4, patient reports he was seen and discharged, reports zofran at home is not working. Pt reports he smokes marijuana daily.       Ene Soliman RN  08/07/21 0989

## 2021-10-22 ENCOUNTER — HOSPITAL ENCOUNTER (EMERGENCY)
Age: 26
Discharge: HOME | End: 2021-10-22
Attending: EMERGENCY MEDICINE
Payer: MEDICAID

## 2021-10-22 VITALS
DIASTOLIC BLOOD PRESSURE: 84 MMHG | HEIGHT: 68 IN | RESPIRATION RATE: 18 BRPM | SYSTOLIC BLOOD PRESSURE: 126 MMHG | TEMPERATURE: 98.4 F | BODY MASS INDEX: 27.89 KG/M2 | OXYGEN SATURATION: 99 % | WEIGHT: 184 LBS | HEART RATE: 70 BPM

## 2021-10-22 DIAGNOSIS — R11.0 NAUSEA: Primary | ICD-10-CM

## 2021-10-22 LAB
ABSOLUTE EOS #: 0.16 K/UL (ref 0–0.44)
ABSOLUTE IMMATURE GRANULOCYTE: <0.03 K/UL (ref 0–0.3)
ABSOLUTE LYMPH #: 4.09 K/UL (ref 1.1–3.7)
ABSOLUTE MONO #: 0.51 K/UL (ref 0.1–1.2)
ALBUMIN SERPL-MCNC: 4.4 G/DL (ref 3.5–5.2)
ALBUMIN/GLOBULIN RATIO: 1.7 (ref 1–2.5)
ALP BLD-CCNC: 112 U/L (ref 40–129)
ALT SERPL-CCNC: 39 U/L (ref 5–41)
ANION GAP SERPL CALCULATED.3IONS-SCNC: 12 MMOL/L (ref 9–17)
AST SERPL-CCNC: 26 U/L
BASOPHILS # BLD: 1 % (ref 0–2)
BASOPHILS ABSOLUTE: 0.04 K/UL (ref 0–0.2)
BILIRUB SERPL-MCNC: 0.34 MG/DL (ref 0.3–1.2)
BUN BLDV-MCNC: 10 MG/DL (ref 6–20)
BUN/CREAT BLD: ABNORMAL (ref 9–20)
CALCIUM SERPL-MCNC: 8.5 MG/DL (ref 8.6–10.4)
CHLORIDE BLD-SCNC: 106 MMOL/L (ref 98–107)
CO2: 22 MMOL/L (ref 20–31)
CREAT SERPL-MCNC: 0.96 MG/DL (ref 0.7–1.2)
DIFFERENTIAL TYPE: ABNORMAL
EOSINOPHILS RELATIVE PERCENT: 3 % (ref 1–4)
GFR AFRICAN AMERICAN: >60 ML/MIN
GFR NON-AFRICAN AMERICAN: >60 ML/MIN
GFR SERPL CREATININE-BSD FRML MDRD: ABNORMAL ML/MIN/{1.73_M2}
GFR SERPL CREATININE-BSD FRML MDRD: ABNORMAL ML/MIN/{1.73_M2}
GLUCOSE BLD-MCNC: 113 MG/DL (ref 70–99)
HCT VFR BLD CALC: 39.5 % (ref 40.7–50.3)
HEMOGLOBIN: 13.1 G/DL (ref 13–17)
IMMATURE GRANULOCYTES: 0 %
LYMPHOCYTES # BLD: 68 % (ref 24–43)
MAGNESIUM: 2.4 MG/DL (ref 1.6–2.6)
MCH RBC QN AUTO: 28.9 PG (ref 25.2–33.5)
MCHC RBC AUTO-ENTMCNC: 33.2 G/DL (ref 28.4–34.8)
MCV RBC AUTO: 87 FL (ref 82.6–102.9)
MONOCYTES # BLD: 9 % (ref 3–12)
NRBC AUTOMATED: 0 PER 100 WBC
PDW BLD-RTO: 12.6 % (ref 11.8–14.4)
PLATELET # BLD: 255 K/UL (ref 138–453)
PLATELET ESTIMATE: ABNORMAL
PMV BLD AUTO: 10 FL (ref 8.1–13.5)
POTASSIUM SERPL-SCNC: 3.5 MMOL/L (ref 3.7–5.3)
RBC # BLD: 4.54 M/UL (ref 4.21–5.77)
RBC # BLD: ABNORMAL 10*6/UL
SEG NEUTROPHILS: 19 % (ref 36–65)
SEGMENTED NEUTROPHILS ABSOLUTE COUNT: 1.11 K/UL (ref 1.5–8.1)
SODIUM BLD-SCNC: 140 MMOL/L (ref 135–144)
TOTAL PROTEIN: 7 G/DL (ref 6.4–8.3)
WBC # BLD: 5.9 K/UL (ref 3.5–11.3)
WBC # BLD: ABNORMAL 10*3/UL

## 2021-10-22 PROCEDURE — 96376 TX/PRO/DX INJ SAME DRUG ADON: CPT

## 2021-10-22 PROCEDURE — 99283 EMERGENCY DEPT VISIT LOW MDM: CPT

## 2021-10-22 PROCEDURE — C9113 INJ PANTOPRAZOLE SODIUM, VIA: HCPCS | Performed by: STUDENT IN AN ORGANIZED HEALTH CARE EDUCATION/TRAINING PROGRAM

## 2021-10-22 PROCEDURE — 80053 COMPREHEN METABOLIC PANEL: CPT

## 2021-10-22 PROCEDURE — 85025 COMPLETE CBC W/AUTO DIFF WBC: CPT

## 2021-10-22 PROCEDURE — 96365 THER/PROPH/DIAG IV INF INIT: CPT

## 2021-10-22 PROCEDURE — 6360000002 HC RX W HCPCS: Performed by: STUDENT IN AN ORGANIZED HEALTH CARE EDUCATION/TRAINING PROGRAM

## 2021-10-22 PROCEDURE — 96375 TX/PRO/DX INJ NEW DRUG ADDON: CPT

## 2021-10-22 PROCEDURE — 83735 ASSAY OF MAGNESIUM: CPT

## 2021-10-22 PROCEDURE — 2580000003 HC RX 258: Performed by: STUDENT IN AN ORGANIZED HEALTH CARE EDUCATION/TRAINING PROGRAM

## 2021-10-22 PROCEDURE — 96361 HYDRATE IV INFUSION ADD-ON: CPT

## 2021-10-22 RX ORDER — FAMOTIDINE 20 MG/1
20 TABLET, FILM COATED ORAL 2 TIMES DAILY
Qty: 60 TABLET | Refills: 0 | Status: SHIPPED | OUTPATIENT
Start: 2021-10-22

## 2021-10-22 RX ORDER — 0.9 % SODIUM CHLORIDE 0.9 %
1000 INTRAVENOUS SOLUTION INTRAVENOUS ONCE
Status: COMPLETED | OUTPATIENT
Start: 2021-10-22 | End: 2021-10-22

## 2021-10-22 RX ORDER — ONDANSETRON 2 MG/ML
4 INJECTION INTRAMUSCULAR; INTRAVENOUS ONCE
Status: COMPLETED | OUTPATIENT
Start: 2021-10-22 | End: 2021-10-22

## 2021-10-22 RX ORDER — ONDANSETRON 4 MG/1
4 TABLET, FILM COATED ORAL EVERY 8 HOURS PRN
Qty: 20 TABLET | Refills: 0 | Status: SHIPPED | OUTPATIENT
Start: 2021-10-22

## 2021-10-22 RX ADMIN — ONDANSETRON 4 MG: 2 INJECTION INTRAMUSCULAR; INTRAVENOUS at 03:37

## 2021-10-22 RX ADMIN — SODIUM CHLORIDE 80 MG: 9 INJECTION, SOLUTION INTRAVENOUS at 03:37

## 2021-10-22 RX ADMIN — ONDANSETRON 4 MG: 2 INJECTION INTRAMUSCULAR; INTRAVENOUS at 02:40

## 2021-10-22 RX ADMIN — SODIUM CHLORIDE 1000 ML: 9 INJECTION, SOLUTION INTRAVENOUS at 02:40

## 2021-10-22 ASSESSMENT — ENCOUNTER SYMPTOMS
ABDOMINAL PAIN: 0
ABDOMINAL DISTENTION: 0
FACIAL SWELLING: 0
DIARRHEA: 1
BLOOD IN STOOL: 1
BACK PAIN: 0
NAUSEA: 1
SHORTNESS OF BREATH: 0
VOMITING: 1
TROUBLE SWALLOWING: 0
CHEST TIGHTNESS: 0

## 2021-10-22 ASSESSMENT — PAIN DESCRIPTION - FREQUENCY: FREQUENCY: CONTINUOUS

## 2021-10-22 ASSESSMENT — PAIN DESCRIPTION - ORIENTATION: ORIENTATION: RIGHT

## 2021-10-22 ASSESSMENT — PAIN DESCRIPTION - LOCATION: LOCATION: ABDOMEN

## 2021-10-22 ASSESSMENT — PAIN DESCRIPTION - PAIN TYPE: TYPE: ACUTE PAIN

## 2021-10-22 ASSESSMENT — PAIN SCALES - GENERAL: PAINLEVEL_OUTOF10: 3

## 2021-10-22 NOTE — ED PROVIDER NOTES
[facility]   Emergency Department  Faculty Attestation       I performed a history and physical examination of the patient and discussed management with the resident. I reviewed the residents note and agree with the documented findings including all diagnostic interpretations and plan of care. Any areas of disagreement are noted on the chart. I was personally present for the key portions of any procedures. I have documented in the chart those procedures where I was not present during the key portions. I have reviewed the emergency nurses triage note. I agree with the chief complaint, past medical history, past surgical history, allergies, medications, social and family history as documented unless otherwise noted below. Documentation of the HPI, Physical Exam and Medical Decision Making performed by scribjohnny is based on my personal performance of the HPI, PE and MDM. For Physician Assistant/ Nurse Practitioner cases/documentation I have personally evaluated this patient and have completed at least one if not all key elements of the E/M (history, physical exam, and MDM). Additional findings are as noted. Pertinent Comments     Primary Care Physician: No primary care provider on file. ED Triage Vitals   BP Temp Temp Source Pulse Resp SpO2 Height Weight   10/22/21 0214 10/22/21 0214 10/22/21 0214 10/22/21 0214 10/22/21 0216 10/22/21 0214 10/22/21 0214 10/22/21 0214   126/84 98.4 °F (36.9 °C) Oral 75 18 99 % 5' 8\" (1.727 m) 184 lb (83.5 kg)        History/Physical: This is a 27 y.o. male who presents to the Emergency Department with complaint of nausea and vomiting with associated bloody emesis. Patient reports nausea for approximately 3 days and then start having emesis today. During my exam, patient states initially in his emesis was a yellow mucus-like looking and then had some mild amounts of blood mixed in, they had one episode that there was a clot of blood.   Has not had any bloody emesis since then. Reported that there was some questionable bright red blood in his stool as well. Denies any lightheadedness or dizziness for me. Denies any chest pain or shortness of breath. States this happened one other time 2 years ago, and they found no underlying cause for this. Denies any alcohol use. Denies any bleeding disorders. On exam, patient is well-appearing in no acute distress sitting up in bed. Normocephalic atraumatic moist mucous membranes. No scleral icterus. Heart sounds are regular with no murmurs or gallops and lungs clear to auscultation bilaterally. Abdomen is soft with very minimal epigastric tenderness. He is alert and oriented x4 with no focal neuro deficits. Extremity exam with no edema. And skin with no pallor or jaundice. MDM/Plan:   Nausea and vomiting. Reported hematemesis as well. However unclear the quantity. Rectal exam Hemoccult negative with no gross blood as well. Abdomen with very minimal tenderness. No peritoneal signs. Low suspicion of a surgical abdomen at this time. Patient is very well-appearing and is young with very minimal risk factors. Had a lower suspicion of a life-threatening GI bleed more likely to be a Jaye-Nation tear or gastritis. Will treat symptomatically with antiemetics and Protonix.   We will check basic labs and then reevaluate patient stable, and tolerated a box lunch feeling improved after nausea medications but okay for discharge with strict return precautions      Critical Care: None     Steph Sousa MD  Attending Emergency Physician         Steph Sousa MD  10/22/21 [id number]

## 2021-10-22 NOTE — ED PROVIDER NOTES
[facility]  ED  Emergency Department Encounter  EmergencyMedicine Resident     Pt Name:Main Turner Junior  MRN: [id number]  [place] 1995  Date of evaluation: 10/22/21  PCP:  No primary care provider on file. This patient was evaluated in the Emergency Department for symptoms described in the history of present illness. The patient was evaluated in the context of the global COVID-19 pandemic, which necessitated consideration that the patient might be at risk for infection with the SARS-CoV-2 virus that causes COVID-19. Institutional protocols and algorithms that pertain to the evaluation of patients at risk for COVID-19 are in a state of rapid change based on information released by regulatory bodies including the CDC and federal and state organizations. These policies and algorithms were followed during the patient's care in the ED. CHIEF COMPLAINT       Chief Complaint   Patient presents with    Hematemesis     reports vomiting dark blood in last few hours; reports poor PO intake xs 4 day        HISTORY OF PRESENT ILLNESS  (Location/Symptom, Timing/Onset, Context/Setting, Quality, Duration, Modifying Factors, Severity.)      Muna Ramos is a 27 y.o. male who presents with complaints of multiple episodes of hematemesis. Patient states that he has been nauseous for a few days and unable to tolerate food or fluids. He states that in the last 2 hours he has been vomiting and there has been significant amounts of blood in his vomit. States that it started out as bright red and got progressively darker. Patient states that this is happened to him before and it was worked up but no cause was ever found. Last time this happened was about 2 years ago. Patient denies any alcohol use, marijuana use, illicit drug use. He states that he has significant nausea but denies any abdominal pain, lightheadedness, dizziness.   Patient also states that he has had diarrhea that also had bright red blood in it. Patient states that he has no history of ulcers or gastric issues. PAST MEDICAL / SURGICAL / SOCIAL / FAMILY HISTORY      has no past medical history on file. Asthma     has no past surgical history on file. Singulair    Social History     Socioeconomic History    Marital status: Single     Spouse name: Not on file    Number of children: Not on file    Years of education: Not on file    Highest education level: Not on file   Occupational History    Not on file   Tobacco Use    Smoking status: Current Every Day Smoker     Packs/day: 1.00     Types: Cigarettes    Smokeless tobacco: Never Used   Vaping Use    Vaping Use: Never used   Substance and Sexual Activity    Alcohol use: Not Currently    Drug use: Yes     Frequency: 7.0 times per week     Types: Marijuana    Sexual activity: Not on file   Other Topics Concern    Not on file   Social History Narrative    Not on file     Social Determinants of Health     Financial Resource Strain:     Difficulty of Paying Living Expenses:    Food Insecurity:     Worried About Running Out of Food in the Last Year:     Ran Out of Food in the Last Year:    Transportation Needs:     Lack of Transportation (Medical):  Lack of Transportation (Non-Medical):    Physical Activity:     Days of Exercise per Week:     Minutes of Exercise per Session:    Stress:     Feeling of Stress :    Social Connections:     Frequency of Communication with Friends and Family:     Frequency of Social Gatherings with Friends and Family:     Attends [employer] Services:     Active Member of Clubs or Organizations:     Attends Club or Organization Meetings:     Marital Status:    Intimate Partner Violence:     Fear of Current or Ex-Partner:     Emotionally Abused:     Physically Abused:     Sexually Abused:        History reviewed. No pertinent family history. Allergies:  Patient has no known allergies.     Home Medications:  Prior to Admission medications Medication Sig Start Date End Date Taking? Authorizing Provider   ondansetron (ZOFRAN) 4 MG tablet Take 1 tablet by mouth every 8 hours as needed for Nausea 10/22/21  Yes Rosendo Miller MD   famotidine (PEPCID) 20 MG tablet Take 1 tablet by mouth 2 times daily 10/22/21  Yes Rosendo Miller MD   montelukast (SINGULAIR) 10 MG tablet Take 10 mg by mouth nightly    Historical Provider, MD       REVIEW OF SYSTEMS    (2-9 systems for level 4, 10 or more for level 5)      Review of Systems   Constitutional: Positive for appetite change. Negative for chills, fatigue and fever. HENT: Negative for facial swelling and trouble swallowing. Eyes: Negative for visual disturbance. Respiratory: Negative for chest tightness and shortness of breath. Cardiovascular: Negative for chest pain. Gastrointestinal: Positive for blood in stool, diarrhea, nausea and vomiting. Negative for abdominal distention and abdominal pain. Genitourinary: Negative for difficulty urinating, flank pain and frequency. Musculoskeletal: Negative for back pain, neck pain and neck stiffness. Neurological: Negative for tremors, seizures, weakness, light-headedness and headaches. Psychiatric/Behavioral: Negative for agitation and hallucinations. PHYSICAL EXAM   (up to 7 for level 4, 8 or more for level 5)      INITIAL VITALS:   /84   Pulse 70   Temp 98.4 °F (36.9 °C) (Oral)   Resp 18   Ht 5' 8\" (1.727 m)   Wt 184 lb (83.5 kg)   SpO2 99%   BMI 27.98 kg/m²     Physical Exam  Exam conducted with a chaperone present. Constitutional:       General: He is awake. Appearance: Normal appearance. HENT:      Head: Normocephalic and atraumatic. Right Ear: External ear normal.      Left Ear: External ear normal.      Nose: Nose normal.   Eyes:      General: Lids are normal.      Extraocular Movements: Extraocular movements intact. Cardiovascular:      Rate and Rhythm: Normal rate. Pulses: Normal pulses.       Heart sounds: Normal heart sounds. Pulmonary:      Effort: Pulmonary effort is normal.      Breath sounds: Normal breath sounds. Abdominal:      General: Abdomen is protuberant. Palpations: Abdomen is soft. Tenderness: There is no abdominal tenderness. Genitourinary:     Prostate: Normal.      Rectum: Normal. Guaiac result negative. No mass, tenderness, external hemorrhoid or internal hemorrhoid. Musculoskeletal:      Cervical back: Normal range of motion. Comments: Normal range of motion, strength and sensation intact in all extremities. Neurological:      Mental Status: He is alert and oriented to person, place, and time. Sensory: Sensation is intact. Motor: Motor function is intact. Psychiatric:         Mood and Affect: Mood normal.         Behavior: Behavior is cooperative. DIFFERENTIAL  DIAGNOSIS     PLAN (LABS / IMAGING / EKG):  Orders Placed This Encounter   Procedures    CBC Auto Differential    Comprehensive Metabolic Panel w/ Reflex to MG    Magnesium       MEDICATIONS ORDERED:  Orders Placed This Encounter   Medications    ondansetron (ZOFRAN) injection 4 mg    0.9 % sodium chloride bolus    pantoprazole (PROTONIX) 80 mg in sodium chloride 0.9 % 50 mL bolus    ondansetron (ZOFRAN) injection 4 mg    ondansetron (ZOFRAN) 4 MG tablet     Sig: Take 1 tablet by mouth every 8 hours as needed for Nausea     Dispense:  20 tablet     Refill:  0    famotidine (PEPCID) 20 MG tablet     Sig: Take 1 tablet by mouth 2 times daily     Dispense:  60 tablet     Refill:  0       DDX: Peptic ulcers, Jaye-Nation tear, cyclical vomiting, alcoholic vomiting, gastritis    MDM: 27 y.o. male presents today with nausea and vomiting for the past few hours. Patient states that he had blood in his vomit. CBC and CMP were ordered. Patient was given Zofran and a normal saline bolus along with a Protonix bolus. Rectal exam was performed with a stool guaiac test being negative. Patient's nausea did not improve after the first dose of Zofran and a second dose was ordered. Patient states that his nausea improved and tolerates p.o. Patient did not vomit while here and states that he feels better. Patient will be discharged with Zofran and Pepcid and instructed to follow-up with PCP or return to the ER if symptoms worsen.         DIAGNOSTIC RESULTS / EMERGENCY DEPARTMENT COURSE / MDM   LAB RESULTS:  Results for orders placed or performed during the hospital encounter of 10/22/21   CBC Auto Differential   Result Value Ref Range    WBC 5.9 3.5 - 11.3 k/uL    RBC 4.54 4.21 - 5.77 m/uL    Hemoglobin 13.1 13.0 - 17.0 g/dL    Hematocrit 39.5 (L) 40.7 - 50.3 %    MCV 87.0 82.6 - 102.9 fL    MCH 28.9 25.2 - 33.5 pg    MCHC 33.2 28.4 - 34.8 g/dL    RDW 12.6 11.8 - 14.4 %    Platelets 813 747 - 743 k/uL    MPV 10.0 8.1 - 13.5 fL    NRBC Automated 0.0 0.0 per 100 WBC    Differential Type NOT REPORTED     Seg Neutrophils 19 (L) 36 - 65 %    Lymphocytes 68 (H) 24 - 43 %    Monocytes 9 3 - 12 %    Eosinophils % 3 1 - 4 %    Basophils 1 0 - 2 %    Immature Granulocytes 0 0 %    Segs Absolute 1.11 (L) 1.50 - 8.10 k/uL    Absolute Lymph # 4.09 (H) 1.10 - 3.70 k/uL    Absolute Mono # 0.51 0.10 - 1.20 k/uL    Absolute Eos # 0.16 0.00 - 0.44 k/uL    Basophils Absolute 0.04 0.00 - 0.20 k/uL    Absolute Immature Granulocyte <0.03 0.00 - 0.30 k/uL    WBC Morphology NOT REPORTED     RBC Morphology NOT REPORTED     Platelet Estimate NOT REPORTED    Comprehensive Metabolic Panel w/ Reflex to MG   Result Value Ref Range    Glucose 113 (H) 70 - 99 mg/dL    BUN 10 6 - 20 mg/dL    CREATININE 0.96 0.70 - 1.20 mg/dL    Bun/Cre Ratio NOT REPORTED 9 - 20    Calcium 8.5 (L) 8.6 - 10.4 mg/dL    Sodium 140 135 - 144 mmol/L    Potassium 3.5 (L) 3.7 - 5.3 mmol/L    Chloride 106 98 - 107 mmol/L    CO2 22 20 - 31 mmol/L    Anion Gap 12 9 - 17 mmol/L    Alkaline Phosphatase 112 40 - 129 U/L    ALT 39 5 - 41 U/L    AST 26 <40 U/L Total Bilirubin 0.34 0.3 - 1.2 mg/dL    Total Protein 7.0 6.4 - 8.3 g/dL    Albumin 4.4 3.5 - 5.2 g/dL    Albumin/Globulin Ratio 1.7 1.0 - 2.5    GFR Non-African American >60 >60 mL/min    GFR African American >60 >60 mL/min    GFR Comment          GFR Staging NOT REPORTED    Magnesium   Result Value Ref Range    Magnesium 2.4 1.6 - 2.6 mg/dL           RADIOLOGY:  No orders to display        EKG  None    EMERGENCY DEPARTMENT COURSE:  ED Course as of Oct 22 0719   Fri Oct 22, 2021   0224 Patient presents today for 8 episodes of hematemesis. He states that he has been vomiting for the last few hours but has been nauseous for a few days. States that this has happened to him before and they could not find a cause.     [SS]   0240 Rectal exam done. Stool guaiac negative.      [SS]   0432 Patient states that his nausea is improved. He states that since being here he has thrown up twice in his mouth. He was given a basin to vomit in if needed. [SS]      ED Course User Index  [SS] Sheyla Graham MD        PROCEDURES:  None    CONSULTS:  None    CRITICAL CARE:  None    FINAL IMPRESSION      1.  Nausea          DISPOSITION / PLAN     DISPOSITION Decision To Discharge 10/22/2021 05:19:38 AM      PATIENT REFERRED TO:  [address] [place], [place] [id number] [phone]  Schedule an appointment as soon as possible for a visit   As needed      DISCHARGE MEDICATIONS:  Discharge Medication List as of 10/22/2021  5:24 AM      START taking these medications    Details   ondansetron (ZOFRAN) 4 MG tablet Take 1 tablet by mouth every 8 hours as needed for Nausea, Disp-20 tablet, R-0Print      famotidine (PEPCID) 20 MG tablet Take 1 tablet by mouth 2 times daily, Disp-60 tablet, R-0Print             Sheyla Graham MD  Emergency Medicine Resident    (Please note that portions of thisnote were completed with a voice recognition program.  Efforts were made to edit the dictations but occasionally words are mis-transcribed.)        Mckenzie Batista MD  Resident  10/22/21 3196

## 2021-11-17 ENCOUNTER — HOSPITAL ENCOUNTER (EMERGENCY)
Age: 26
Discharge: LEFT BEFORE DISCHARGE | End: 2021-11-17

## 2022-10-10 ENCOUNTER — HOSPITAL ENCOUNTER (EMERGENCY)
Age: 27
Discharge: HOME | End: 2022-10-10
Attending: EMERGENCY MEDICINE

## 2022-10-10 ENCOUNTER — APPOINTMENT (OUTPATIENT)
Dept: GENERAL RADIOLOGY | Age: 27
End: 2022-10-10

## 2022-10-10 VITALS
HEART RATE: 49 BPM | SYSTOLIC BLOOD PRESSURE: 121 MMHG | TEMPERATURE: 97.2 F | DIASTOLIC BLOOD PRESSURE: 74 MMHG | RESPIRATION RATE: 13 BRPM | OXYGEN SATURATION: 100 %

## 2022-10-10 DIAGNOSIS — R07.89 CHEST WALL PAIN: Primary | ICD-10-CM

## 2022-10-10 PROCEDURE — 99284 EMERGENCY DEPT VISIT MOD MDM: CPT

## 2022-10-10 PROCEDURE — 71046 X-RAY EXAM CHEST 2 VIEWS: CPT

## 2022-10-10 PROCEDURE — 94640 AIRWAY INHALATION TREATMENT: CPT

## 2022-10-10 PROCEDURE — 6370000000 HC RX 637 (ALT 250 FOR IP): Performed by: STUDENT IN AN ORGANIZED HEALTH CARE EDUCATION/TRAINING PROGRAM

## 2022-10-10 PROCEDURE — 6360000002 HC RX W HCPCS: Performed by: STUDENT IN AN ORGANIZED HEALTH CARE EDUCATION/TRAINING PROGRAM

## 2022-10-10 PROCEDURE — 93005 ELECTROCARDIOGRAM TRACING: CPT

## 2022-10-10 PROCEDURE — 96374 THER/PROPH/DIAG INJ IV PUSH: CPT

## 2022-10-10 RX ORDER — KETOROLAC TROMETHAMINE 30 MG/ML
30 INJECTION, SOLUTION INTRAMUSCULAR; INTRAVENOUS ONCE
Status: COMPLETED | OUTPATIENT
Start: 2022-10-10 | End: 2022-10-10

## 2022-10-10 RX ORDER — ACETAMINOPHEN 500 MG
1000 TABLET ORAL ONCE
Status: COMPLETED | OUTPATIENT
Start: 2022-10-10 | End: 2022-10-10

## 2022-10-10 RX ORDER — IPRATROPIUM BROMIDE AND ALBUTEROL SULFATE 2.5; .5 MG/3ML; MG/3ML
1 SOLUTION RESPIRATORY (INHALATION) ONCE
Status: COMPLETED | OUTPATIENT
Start: 2022-10-10 | End: 2022-10-10

## 2022-10-10 RX ORDER — ACETAMINOPHEN 500 MG
500 TABLET ORAL EVERY 6 HOURS PRN
Qty: 20 TABLET | Refills: 0 | Status: SHIPPED | OUTPATIENT
Start: 2022-10-10 | End: 2022-10-25

## 2022-10-10 RX ORDER — IBUPROFEN 600 MG/1
600 TABLET ORAL EVERY 6 HOURS PRN
Qty: 20 TABLET | Refills: 1 | Status: SHIPPED | OUTPATIENT
Start: 2022-10-10

## 2022-10-10 RX ADMIN — KETOROLAC TROMETHAMINE 30 MG: 30 INJECTION, SOLUTION INTRAMUSCULAR; INTRAVENOUS at 09:47

## 2022-10-10 RX ADMIN — ACETAMINOPHEN 1000 MG: 500 TABLET ORAL at 09:07

## 2022-10-10 RX ADMIN — IPRATROPIUM BROMIDE AND ALBUTEROL SULFATE 1 AMPULE: .5; 2.5 SOLUTION RESPIRATORY (INHALATION) at 08:34

## 2022-10-10 ASSESSMENT — PAIN - FUNCTIONAL ASSESSMENT
PAIN_FUNCTIONAL_ASSESSMENT: 0-10
PAIN_FUNCTIONAL_ASSESSMENT: NONE - DENIES PAIN

## 2022-10-10 ASSESSMENT — ENCOUNTER SYMPTOMS
BACK PAIN: 0
ABDOMINAL PAIN: 1
SHORTNESS OF BREATH: 0
COUGH: 0
BLOOD IN STOOL: 0
RHINORRHEA: 0

## 2022-10-10 ASSESSMENT — PAIN SCALES - GENERAL: PAINLEVEL_OUTOF10: 8

## 2022-10-10 NOTE — ED NOTES
Pt [place] from [facility] [place] [address] [place] [address]  10/10/22 0922

## 2022-10-10 NOTE — DISCHARGE INSTRUCTIONS
Take your medication as indicated. For pain use ibuprofen (Motrin / Advil) or acetaminophen (Tylenol), unless prescribed medications that have acetaminophen in it. You can take over the counter acetaminophen tablets (1 - 2 tablets of the 500-mg strength every 6 hours) or ibuprofen tablets (2 tablets every 4 hours). Tylenol can be taken every 6 hours. Ibuprofen can be taken every 6 hours. It is recommended you alternate the two every three hours. Example pain medication schedule:  - 9am: Tylenol  - 12 pm/noon: Ibuprofen  - 3pm: Tylenol  - 6pm: Ibuprofen    PLEASE RETURN TO THE EMERGENCY DEPARTMENT IMMEDIATELY for worsening symptoms of increasing pain, shortness of breath, feeling of your heart fluttering or racing, swelling to your feet, unable to lay flat, or if you develop any concerning symptoms such as: high fever not relieved by acetaminophen (Tylenol) and/or ibuprofen (Motrin / Advil), chills, persistent nausea and/or vomiting, loss of consciousness, numbness, weakness or tingling in the arms or legs or change in color of the extremities, changes in mental status, persistent headache, blurry vision, loss of bladder / bowel control, unable to follow up with your physician, or other any other care or concern.

## 2022-10-10 NOTE — Clinical Note
Marjan Gurrola was seen and treated in our emergency department on 10/10/2022. He may return to work on 10/11/2022. If you have any questions or concerns, please don't hesitate to call.       Chantelle Corona MD

## 2022-10-10 NOTE — ED PROVIDER NOTES
[facility]  ED  Emergency Department Encounter  Emergency Medicine Resident     Pt Name: Ar Morton  MRN: [id number]  [place] 1995  Date of evaluation: 10/10/22  PCP:  No primary care provider on file. CHIEF COMPLAINT       Chief Complaint   Patient presents with    Chest Pain     Since last night    Shortness of Breath     HISTORY OFPRESENT ILLNESS  (Location/Symptom, Timing/Onset, Context/Setting, Quality, Duration, Modifying Martha Councilman.)      Ar Morton is a 24 y.o. male who presents with right-sided chest pain which began around 11 PM last night while at work. Patient denies any specific injury or motion that occurred prior to the pain occurring. Patient does smoke 1 cigarette a day and chart review also endorses marijuana use however patient denied any marijuana or other drug use. Patient said he has had a similar type of pain in the past that did go away by itself. He has not tried any over-the-counter medications to reduce his pain symptoms. There is no change in the pain with position. No significant family medical history/cardiac history. PAST MEDICAL / SURGICAL / SOCIAL / FAMILY HISTORY     PMH: asthma   On review with patient he denies any past  surgical history.       Social History     Socioeconomic History    Marital status: Single     Spouse name: Not on file    Number of children: Not on file    Years of education: Not on file    Highest education level: Not on file   Occupational History    Not on file   Tobacco Use    Smoking status: Every Day     Packs/day: 1.00     Types: Cigarettes    Smokeless tobacco: Never   Vaping Use    Vaping Use: Never used   Substance and Sexual Activity    Alcohol use: Not Currently    Drug use: Yes     Frequency: 7.0 times per week     Types: Marijuana Bunny Kiran    Sexual activity: Not on file   Other Topics Concern    Not on file   Social History Narrative    Not on file     Social Determinants of Health     Financial Resource Strain: Not on file   Food Insecurity: Not on file   Transportation Needs: Not on file   Physical Activity: Not on file   Stress: Not on file   Social Connections: Not on file   Intimate Partner Violence: Not on file   Housing Stability: Not on file     History reviewed. No pertinent family history. Allergies:  Patient has no known allergies. Home Medications:  Prior to Admission medications    Medication Sig Start Date End Date Taking? Authorizing Provider   acetaminophen (TYLENOL) 500 MG tablet Take 1 tablet by mouth every 6 hours as needed for Pain 10/10/22 10/25/22 Yes Chantelle Corona MD   ibuprofen (ADVIL;MOTRIN) 600 MG tablet Take 1 tablet by mouth every 6 hours as needed for Pain 10/10/22  Yes Chantelle Corona MD   ondansetron (ZOFRAN) 4 MG tablet Take 1 tablet by mouth every 8 hours as needed for Nausea 10/22/21   Jose Tan MD   famotidine (PEPCID) 20 MG tablet Take 1 tablet by mouth 2 times daily 10/22/21   Jose Tan MD   montelukast (SINGULAIR) 10 MG tablet Take 10 mg by mouth nightly    Historical Provider, MD     REVIEW OF SYSTEMS    (2-9 systems for level 4, 10 or more for level 5)      Review of Systems   Constitutional:  Negative for fever. HENT:  Negative for congestion and rhinorrhea. Eyes:  Negative for visual disturbance. Respiratory:  Negative for cough and shortness of breath. Cardiovascular:  Negative for chest pain. Gastrointestinal:  Positive for abdominal pain. Negative for blood in stool. Genitourinary:  Negative for dysuria. Musculoskeletal:  Negative for back pain and gait problem. Skin:  Negative for wound. Neurological:  Negative for headaches. PHYSICAL EXAM   (up to 7 for level 4, 8 or more for level 5)     INITIAL VITALS:    oral temperature is 97.2 °F (36.2 °C). His blood pressure is 121/74 and his pulse is 49 (abnormal). His respiration is 13 and oxygen saturation is 100%.      Physical Exam  Constitutional:       Appearance: He is well-developed. He is not ill-appearing or toxic-appearing. HENT:      Head: Normocephalic. Mouth/Throat:      Mouth: Mucous membranes are moist.   Eyes:      Extraocular Movements: Extraocular movements intact. Pupils: Pupils are equal, round, and reactive to light. Cardiovascular:      Rate and Rhythm: Normal rate and regular rhythm. Comments: Tenderness with palpation over right anterior chest wall at costochondral margin  Pulmonary:      Effort: Pulmonary effort is normal.      Breath sounds: Normal breath sounds. No decreased breath sounds or wheezing. Chest:      Chest wall: No tenderness. Abdominal:      Palpations: Abdomen is soft. Tenderness: There is no abdominal tenderness. There is no guarding or rebound. Musculoskeletal:         General: Normal range of motion. Right lower leg: No tenderness. No edema. Left lower leg: No tenderness. No edema. Neurological:      General: No focal deficit present. Mental Status: He is alert and oriented to person, place, and time.        DIFFERENTIAL  DIAGNOSIS     PLAN (LABS / IMAGING / EKG):  Orders Placed This Encounter   Procedures    XR CHEST (2 VW)    EKG 12 Lead     MEDICATIONS ORDERED:  Orders Placed This Encounter   Medications    ipratropium-albuterol (DUONEB) nebulizer solution 1 ampule     Order Specific Question:   Initiate RT Bronchodilator Protocol     Answer:   Yes - ED protocol    acetaminophen (TYLENOL) tablet 1,000 mg    ketorolac (TORADOL) injection 30 mg    acetaminophen (TYLENOL) 500 MG tablet     Sig: Take 1 tablet by mouth every 6 hours as needed for Pain     Dispense:  20 tablet     Refill:  0    ibuprofen (ADVIL;MOTRIN) 600 MG tablet     Sig: Take 1 tablet by mouth every 6 hours as needed for Pain     Dispense:  20 tablet     Refill:  1     DDX: Chest wall pain, costochondritis, ammonia, asthma laceration    Initial MDM/Plan: 24 y.o. male who presents with right-sided chest wall pain which he describes as a tightness which started around 11 PM last night. History of asthma. Will obtain chest x-ray and give breathing treatment to patient if no improvement will give Tylenol and NSAID and reevaluate. Suspect costochondritis versus chest wall pain/atypical pain, specially considering normal EKG and no significant past medical history or family medical history. DIAGNOSTIC RESULTS / EMERGENCY DEPARTMENT COURSE / MDM     LABS:  Labs Reviewed - No data to display    RADIOLOGY:  XR CHEST (2 VW)    Result Date: 10/10/2022  EXAMINATION: TWO XRAY VIEWS OF THE CHEST 10/10/2022 9:18 am COMPARISON: None. HISTORY: ORDERING SYSTEM PROVIDED HISTORY: chest tightness TECHNOLOGIST PROVIDED HISTORY: chest tightness FINDINGS: The cardiomediastinal silhouette is within normal limits. There is no consolidation, pneumothorax or evidence for edema. No evidence for effusion. No acute osseous abnormality is identified. No acute airspace disease identified. EKG  Bradycardia at 57 bpm.  Normal axis. Normal intervals with QTC 39 ms. Able to do each EKG consistent with young age. No acute ST segment changes or T wave changes. Normal EKG    All EKG's are interpreted by the Emergency Department Physician who either signs or Co-signs this chart in the absence of a cardiologist.          Luciana Coma Scale  Eye Opening: Spontaneous  Best Verbal Response: Oriented  Best Motor Response: Obeys commands  Luciana Coma Scale Score: 15    EMERGENCY DEPARTMENT COURSE:  ED Course as of 10/10/22 2106   Mon Oct 10, 2022   0956 XR CHEST (2 VW)  No airspace disease.  [CP]   8563 Giving toradol as tylenol did not improve chest pain [CP]   0956 Patient reports chest pain has improved some [CP]   1000 Informed patient's heart rate briefly dropped to 36 while sleeping, did improve after waking up [CP]   1013 Explained result of today's workup and plan to discharge on pain control and reasons for follow up.  Patient agreeable. [CP]      ED Course User Index  [CP] Sparkle Huff MD     PROCEDURES:  None    CONSULTS:  None    CRITICAL CARE:  Please see attending note    FINAL IMPRESSION      1.  Chest wall pain          DISPOSITION / PLAN     DISPOSITION Decision To Discharge 10/10/2022 10:13:46 AM      PATIENTREFERRED TO:  [facility] [address] [address] [phone]  Schedule an appointment as soon as possible for a visit   to establish primary care physician    [facility]  [address] [place] [address]  [phone]    As needed, If symptoms worsen    DISCHARGE MEDICATIONS:  Discharge Medication List as of 10/10/2022 10:16 AM        START taking these medications    Details   acetaminophen (TYLENOL) 500 MG tablet Take 1 tablet by mouth every 6 hours as needed for Pain, Disp-20 tablet, R-0Print      ibuprofen (ADVIL;MOTRIN) 600 MG tablet Take 1 tablet by mouth every 6 hours as needed for Pain, Disp-20 tablet, R-1Print             Charolette Babinski, MD  Emergency Medicine Resident    (Please note that portions of this note were completed with a voice recognition program.  Efforts were made to edit the dictations but occasionally words are mis-transcribed.)        Sparkle Huff MD  Resident  10/10/22 5954

## 2022-10-10 NOTE — ED PROVIDER NOTES
[facility]     Emergency Department     Faculty Attestation    I performed a history and physical examination of the patient and discussed management with the resident. I reviewed the residents note and agree with the documented findings including all diagnostic interpretations and plan of care. Any areas of disagreement are noted on the chart. I was personally present for the key portions of any procedures. I have documented in the chart those procedures where I was not present during the key portions. I have reviewed the emergency nurses triage note. I agree with the chief complaint, past medical history, past surgical history, allergies, medications, social and family history as documented unless otherwise noted below. Documentation of the HPI, Physical Exam and Medical Decision Making performed by alissaibjohnny is based on my personal performance of the HPI, PE and MDM. For Physician Assistant/ Nurse Practitioner cases/documentation I have personally evaluated this patient and have completed at least one if not all key elements of the E/M (history, physical exam, and MDM). Additional findings are as noted. Primary Care Physician: No primary care provider on file. History: This is a 24 y.o. male who presents to the Emergency Department with complaint of chest pain. Right-sided. Sharp. Worse with breathing. History of asthma has not had any recent issues with asthma. No fever. No cough. Physical:     oral temperature is 97.2 °F (36.2 °C). His blood pressure is 121/74 and his pulse is 49 (abnormal). His respiration is 13 and oxygen saturation is 100%. 24 y.o. male no acute distress, cardiac exam bradycardic regular, pulmonary clear bilaterally active receiving breathing treatment at this time. Abdomen soft nontender nondistended.   Does have reproducible chest pain on palpation of the right-sided chest    Impression: Chest pain, musculoskeletal versus pulmonary?     Plan: EKG, x-ray, aerosols, symptomatic treatment, reassess    EKG Interpretation  EKG Interpretation    Interpreted by emergency department physician    Rhythm: sinus bradycardia  Rate: 50-60  Axis: normal  Ectopy: none  Conduction: normal  ST Segments: normal  T Waves: normal  Q Waves: none    EKG  Impression: sinus bradycardia    Brittney Gonzalez MD    Interpreted by jose Cuevas MD, Neida Habermann  Attending Emergency Physician        Brittney Gonzalez MD  10/10/22 1024
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
## 2022-10-15 LAB
EKG ATRIAL RATE: 57 BPM
EKG P AXIS: 62 DEGREES
EKG P-R INTERVAL: 154 MS
EKG Q-T INTERVAL: 400 MS
EKG QRS DURATION: 102 MS
EKG QTC CALCULATION (BAZETT): 389 MS
EKG R AXIS: 41 DEGREES
EKG T AXIS: 59 DEGREES
EKG VENTRICULAR RATE: 57 BPM

## 2022-11-06 ENCOUNTER — HOSPITAL ENCOUNTER (EMERGENCY)
Age: 27
Discharge: HOME | End: 2022-11-06
Attending: EMERGENCY MEDICINE

## 2022-11-06 VITALS
WEIGHT: 165 LBS | OXYGEN SATURATION: 99 % | DIASTOLIC BLOOD PRESSURE: 82 MMHG | HEIGHT: 69 IN | RESPIRATION RATE: 16 BRPM | SYSTOLIC BLOOD PRESSURE: 129 MMHG | HEART RATE: 79 BPM | BODY MASS INDEX: 24.44 KG/M2 | TEMPERATURE: 97.9 F

## 2022-11-06 DIAGNOSIS — R51.9 ACUTE NONINTRACTABLE HEADACHE, UNSPECIFIED HEADACHE TYPE: Primary | ICD-10-CM

## 2022-11-06 DIAGNOSIS — R11.2 NAUSEA AND VOMITING, UNSPECIFIED VOMITING TYPE: ICD-10-CM

## 2022-11-06 LAB
ABSOLUTE EOS #: 0.19 K/UL (ref 0–0.44)
ABSOLUTE IMMATURE GRANULOCYTE: <0.03 K/UL (ref 0–0.3)
ABSOLUTE LYMPH #: 3.49 K/UL (ref 1.1–3.7)
ABSOLUTE MONO #: 0.84 K/UL (ref 0.1–1.2)
ANION GAP SERPL CALCULATED.3IONS-SCNC: 10 MMOL/L (ref 9–17)
BASOPHILS # BLD: 0 % (ref 0–2)
BASOPHILS ABSOLUTE: 0.03 K/UL (ref 0–0.2)
BUN BLDV-MCNC: 15 MG/DL (ref 6–20)
CALCIUM SERPL-MCNC: 8.5 MG/DL (ref 8.6–10.4)
CHLORIDE BLD-SCNC: 104 MMOL/L (ref 98–107)
CO2: 24 MMOL/L (ref 20–31)
CREAT SERPL-MCNC: 0.88 MG/DL (ref 0.7–1.2)
EOSINOPHILS RELATIVE PERCENT: 3 % (ref 1–4)
GFR SERPL CREATININE-BSD FRML MDRD: >60 ML/MIN/1.73M2
GLUCOSE BLD-MCNC: 119 MG/DL (ref 70–99)
HCT VFR BLD CALC: 39.8 % (ref 40.7–50.3)
HEMOGLOBIN: 13.2 G/DL (ref 13–17)
IMMATURE GRANULOCYTES: 0 %
LYMPHOCYTES # BLD: 50 % (ref 24–43)
MCH RBC QN AUTO: 30 PG (ref 25.2–33.5)
MCHC RBC AUTO-ENTMCNC: 33.2 G/DL (ref 28.4–34.8)
MCV RBC AUTO: 90.5 FL (ref 82.6–102.9)
MONOCYTES # BLD: 12 % (ref 3–12)
NRBC AUTOMATED: 0 PER 100 WBC
PDW BLD-RTO: 13.4 % (ref 11.8–14.4)
PLATELET # BLD: 258 K/UL (ref 138–453)
PMV BLD AUTO: 9.9 FL (ref 8.1–13.5)
POTASSIUM SERPL-SCNC: 3.9 MMOL/L (ref 3.7–5.3)
RBC # BLD: 4.4 M/UL (ref 4.21–5.77)
SEG NEUTROPHILS: 35 % (ref 36–65)
SEGMENTED NEUTROPHILS ABSOLUTE COUNT: 2.48 K/UL (ref 1.5–8.1)
SODIUM BLD-SCNC: 138 MMOL/L (ref 135–144)
WBC # BLD: 7 K/UL (ref 3.5–11.3)

## 2022-11-06 PROCEDURE — 2580000003 HC RX 258: Performed by: STUDENT IN AN ORGANIZED HEALTH CARE EDUCATION/TRAINING PROGRAM

## 2022-11-06 PROCEDURE — 99284 EMERGENCY DEPT VISIT MOD MDM: CPT

## 2022-11-06 PROCEDURE — 85025 COMPLETE CBC W/AUTO DIFF WBC: CPT

## 2022-11-06 PROCEDURE — 80048 BASIC METABOLIC PNL TOTAL CA: CPT

## 2022-11-06 PROCEDURE — 6370000000 HC RX 637 (ALT 250 FOR IP): Performed by: STUDENT IN AN ORGANIZED HEALTH CARE EDUCATION/TRAINING PROGRAM

## 2022-11-06 RX ORDER — ACETAMINOPHEN 325 MG/1
650 TABLET ORAL EVERY 6 HOURS PRN
Qty: 120 TABLET | Refills: 0 | Status: SHIPPED | OUTPATIENT
Start: 2022-11-06

## 2022-11-06 RX ORDER — ONDANSETRON 4 MG/1
4 TABLET, ORALLY DISINTEGRATING ORAL EVERY 8 HOURS PRN
Qty: 20 TABLET | Refills: 0 | Status: SHIPPED | OUTPATIENT
Start: 2022-11-06

## 2022-11-06 RX ORDER — ACETAMINOPHEN 500 MG
1000 TABLET ORAL ONCE
Status: COMPLETED | OUTPATIENT
Start: 2022-11-06 | End: 2022-11-06

## 2022-11-06 RX ORDER — 0.9 % SODIUM CHLORIDE 0.9 %
1000 INTRAVENOUS SOLUTION INTRAVENOUS ONCE
Status: COMPLETED | OUTPATIENT
Start: 2022-11-06 | End: 2022-11-06

## 2022-11-06 RX ORDER — ONDANSETRON 4 MG/1
4 TABLET, ORALLY DISINTEGRATING ORAL ONCE
Status: COMPLETED | OUTPATIENT
Start: 2022-11-06 | End: 2022-11-06

## 2022-11-06 RX ADMIN — SODIUM CHLORIDE 1000 ML: 9 INJECTION, SOLUTION INTRAVENOUS at 05:22

## 2022-11-06 RX ADMIN — ACETAMINOPHEN 1000 MG: 500 TABLET ORAL at 05:20

## 2022-11-06 RX ADMIN — ONDANSETRON 4 MG: 4 TABLET, ORALLY DISINTEGRATING ORAL at 05:20

## 2022-11-06 ASSESSMENT — ENCOUNTER SYMPTOMS
TROUBLE SWALLOWING: 0
ABDOMINAL DISTENTION: 0
SINUS PRESSURE: 0
NAUSEA: 1
SINUS PAIN: 0
SORE THROAT: 0
CHEST TIGHTNESS: 0
COUGH: 0
WHEEZING: 0
VOMITING: 1
FACIAL SWELLING: 0
COLOR CHANGE: 0
CONSTIPATION: 0
ABDOMINAL PAIN: 0
SHORTNESS OF BREATH: 0
DIARRHEA: 0

## 2022-11-06 ASSESSMENT — PAIN SCALES - GENERAL: PAINLEVEL_OUTOF10: 8

## 2022-11-06 ASSESSMENT — PAIN DESCRIPTION - LOCATION: LOCATION: HEAD

## 2022-11-06 ASSESSMENT — PAIN DESCRIPTION - PAIN TYPE: TYPE: ACUTE PAIN

## 2022-11-06 ASSESSMENT — PAIN DESCRIPTION - DESCRIPTORS: DESCRIPTORS: ACHING

## 2022-11-06 ASSESSMENT — PAIN DESCRIPTION - FREQUENCY: FREQUENCY: CONTINUOUS

## 2022-11-06 NOTE — ED PROVIDER NOTES
[facility]  ED  Emergency Department Encounter  Emergency Medicine Resident     Pt Name: Prasad Locke  MRN: [id number]  [place] 1995  Date of evaluation: 11/6/22  PCP:  No primary care provider on file. CHIEF COMPLAINT       Chief Complaint   Patient presents with    Headache    Nausea       HISTORY OFPRESENT ILLNESS  (Location/Symptom, Timing/Onset, Context/Setting, Quality, Duration, Modifying Factors,Severity.)      Prasad Locke is a 24 y. o.yo male who presents with concerns of headache nausea and vomiting for the last week. Patient states that he has been having vomiting since Monday. He states that he has been taking Tylenol and Motrin for the headache but has not been able to keep any liquids down secondary to the nausea and vomiting. He denies any chest pain, shortness of breath, fever or chills. He denies any abdominal pain diarrhea or constipation. Patient denies any new changes to his diet or heavy alcohol use. He denies any known sick contacts    PAST MEDICAL / SURGICAL / SOCIAL / FAMILY HISTORY      has no past medical history on file. has no past surgical history on file.      Social History     Socioeconomic History    Marital status: Single     Spouse name: Not on file    Number of children: Not on file    Years of education: Not on file    Highest education level: Not on file   Occupational History    Not on file   Tobacco Use    Smoking status: Every Day     Packs/day: 1.00     Types: Cigarettes    Smokeless tobacco: Never   Vaping Use    Vaping Use: Never used   Substance and Sexual Activity    Alcohol use: Not Currently    Drug use: Yes     Frequency: 7.0 times per week     Types: Marijuana Velgeorgie Granda)    Sexual activity: Not on file   Other Topics Concern    Not on file   Social History Narrative    Not on file     Social Determinants of Health     Financial Resource Strain: Not on file   Food Insecurity: Not on file   Transportation Needs: Not on file Physical Activity: Not on file   Stress: Not on file   Social Connections: Not on file   Intimate Partner Violence: Not on file   Housing Stability: Not on file       History reviewed. No pertinent family history. Allergies:  Patient has no known allergies. Home Medications:  Prior to Admission medications    Medication Sig Start Date End Date Taking? Authorizing Provider   ondansetron (ZOFRAN ODT) 4 MG disintegrating tablet Place 1 tablet under the tongue every 8 hours as needed for Nausea 11/6/22  Yes Martha Traylor DO   acetaminophen (TYLENOL) 325 MG tablet Take 2 tablets by mouth every 6 hours as needed for Pain 11/6/22  Yes Martha Traylor DO   ibuprofen (ADVIL;MOTRIN) 600 MG tablet Take 1 tablet by mouth every 6 hours as needed for Pain 10/10/22   Roel Hernandez MD   ondansetron (ZOFRAN) 4 MG tablet Take 1 tablet by mouth every 8 hours as needed for Nausea 10/22/21   Genaro Baker MD   famotidine (PEPCID) 20 MG tablet Take 1 tablet by mouth 2 times daily 10/22/21   Genaro Baker MD   montelukast (SINGULAIR) 10 MG tablet Take 10 mg by mouth nightly    Historical Provider, MD       REVIEW OFSYSTEMS    (2-9 systems for level 4, 10 or more for level 5)      Review of Systems   Constitutional:  Negative for chills, diaphoresis, fatigue and fever. HENT:  Negative for facial swelling, nosebleeds, sinus pressure, sinus pain, sore throat and trouble swallowing. Respiratory:  Negative for cough, chest tightness, shortness of breath and wheezing. Cardiovascular:  Negative for chest pain, palpitations and leg swelling. Gastrointestinal:  Positive for nausea and vomiting. Negative for abdominal distention, abdominal pain, constipation and diarrhea. Endocrine: Negative for polydipsia, polyphagia and polyuria. Genitourinary:  Negative for dysuria, flank pain and hematuria. Musculoskeletal:  Negative for arthralgias, gait problem, neck pain and neck stiffness.    Skin:  Negative for color change, rash and wound. Neurological:  Positive for headaches. Negative for dizziness, seizures, syncope, weakness, light-headedness and numbness. PHYSICAL EXAM   (up to 7 for level 4, 8 or more forlevel 5)      INITIAL VITALS:   ED Triage Vitals [11/06/22 0437]   BP Temp Temp src Heart Rate Resp SpO2 Height Weight   129/82 97.9 -- 79 16 99 % 5' 9\" (1.753 m) 165 lb (74.8 kg)       Physical Exam  Constitutional:       General: He is not in acute distress. Appearance: He is normal weight. He is not ill-appearing. HENT:      Head: Normocephalic and atraumatic. Right Ear: External ear normal.      Left Ear: External ear normal.      Nose: Nose normal.      Mouth/Throat:      Mouth: Mucous membranes are dry. Pharynx: Oropharynx is clear. No posterior oropharyngeal erythema. Eyes:      General: No scleral icterus. Extraocular Movements: Extraocular movements intact. Conjunctiva/sclera: Conjunctivae normal.      Pupils: Pupils are equal, round, and reactive to light. Cardiovascular:      Rate and Rhythm: Normal rate and regular rhythm. Pulses: Normal pulses. Heart sounds: Normal heart sounds. Pulmonary:      Effort: Pulmonary effort is normal. No respiratory distress. Breath sounds: Normal breath sounds. Abdominal:      General: Abdomen is flat. Bowel sounds are normal. There is no distension. Palpations: Abdomen is soft. Tenderness: There is no abdominal tenderness. There is no guarding. Musculoskeletal:         General: Normal range of motion. Cervical back: Normal range of motion. No muscular tenderness. Skin:     General: Skin is warm and dry. Neurological:      General: No focal deficit present. Mental Status: He is alert and oriented to person, place, and time. Cranial Nerves: No cranial nerve deficit.        DIFFERENTIAL  DIAGNOSIS     PLAN (LABS / IMAGING / EKG):  Orders Placed This Encounter   Procedures    CBC with Auto Differential    Basic Metabolic Panel w/ Reflex to MG       MEDICATIONS ORDERED:  Orders Placed This Encounter   Medications    0.9 % sodium chloride bolus    acetaminophen (TYLENOL) tablet 1,000 mg    ondansetron (ZOFRAN-ODT) disintegrating tablet 4 mg    ondansetron (ZOFRAN ODT) 4 MG disintegrating tablet     Sig: Place 1 tablet under the tongue every 8 hours as needed for Nausea     Dispense:  20 tablet     Refill:  0    acetaminophen (TYLENOL) 325 MG tablet     Sig: Take 2 tablets by mouth every 6 hours as needed for Pain     Dispense:  120 tablet     Refill:  0       DDX: Viral illness, dehydration, migraine    Initial MDM/Plan: 24 y.o. male who presents with headache nausea vomiting x1 week. Patient states that he has not been able to take any liquids since Monday. On physical exam his vitals are within normal limits has minimally dry mucous membranes otherwise appears well. There is no abdominal pain. We will check Blood work to ensure patient is not dehydrated and treat symptomatically. Patient has no neurodeficits with a headache and it does improve with Tylenol. DIAGNOSTIC RESULTS / EMERGENCYDEPARTMENT COURSE / MDM     LABS:  Labs Reviewed   CBC WITH AUTO DIFFERENTIAL - Abnormal; Notable for the following components:       Result Value    Hematocrit 39.8 (*)     Seg Neutrophils 35 (*)     Lymphocytes 50 (*)     All other components within normal limits   BASIC METABOLIC PANEL W/ REFLEX TO MG FOR LOW K - Abnormal; Notable for the following components:    Glucose 119 (*)     Calcium 8.5 (*)     All other components within normal limits         RADIOLOGY:  No results found. EKG      All EKG's are interpreted by the Emergency Department Physicianwho either signs or Co-signs this chart in the absence of a cardiologist.    EMERGENCY DEPARTMENT COURSE:  ED Course as of 11/06/22 0640   Jamie Barton Nov 06, 2022   0932 Lab work-up unremarkable.  [CD]   8530 Will p.o. challenge and discharge home [CD]   8081 Patient was able to tolerate oral Tylenol and water without any emesis. Will discharge home [CD]      ED Course User Index  [CD] Phani Carlton DO          PROCEDURES:  None    CONSULTS:  None    CRITICAL CARE:  None    FINAL IMPRESSION      1. Acute nonintractable headache, unspecified headache type    2.  Nausea and vomiting, unspecified vomiting type          DISPOSITION / PLAN     DISPOSITION Decision To Discharge 11/06/2022 06:01:15 AM      PATIENT REFERRED TO:  [address] [place] [address] Road  [address] [place] [address] [phone]  Schedule an appointment as soon as possible for a visit in 3 days  For ER follow-up    [facility]  [address] [facility] [address] [phone]  Go to   If symptoms worsen    DISCHARGE MEDICATIONS:  Discharge Medication List as of 11/6/2022  6:03 AM        START taking these medications    Details   ondansetron (ZOFRAN ODT) 4 MG disintegrating tablet Place 1 tablet under the tongue every 8 hours as needed for Nausea, Disp-20 tablet, R-0Print             Phani Carlton DO  Emergency Medicine Resident    (Please note that portions of this note were completed with a voice recognition program.Efforts were made to edit the dictations but occasionally words are mis-transcribed.)        Phani Carlton DO  Resident  11/06/22 9326

## 2022-11-06 NOTE — Clinical Note
Lenisindy Fredy was seen and treated in our emergency department on 11/6/2022. He may return to work on 11/07/2022. If you have any questions or concerns, please don't hesitate to call.       Xenia Draper, DO

## 2022-11-06 NOTE — ED NOTES
PT presents to The ED with N/V and headache for a couple days as stated by th pt.  PT states he last took Ibuprofen yesterday     Eldon Urbano RN  11/06/22 6487 none present

## 2022-11-08 NOTE — ED PROVIDER NOTES
[address] [facility]   Emergency Department  Faculty Attestation       I performed a history and physical examination of the patient and discussed management with the resident. I reviewed the residents note and agree with the documented findings including all diagnostic interpretations and plan of care. Any areas of disagreement are noted on the chart. I was personally present for the key portions of any procedures. I have documented in the chart those procedures where I was not present during the key portions. I have reviewed the emergency nurses triage note. I agree with the chief complaint, past medical history, past surgical history, allergies, medications, social and family history as documented unless otherwise noted below. Documentation of the HPI, Physical Exam and Medical Decision Making performed by scribjohnny is based on my personal performance of the HPI, PE and MDM. For Physician Assistant/ Nurse Practitioner cases/documentation I have personally evaluated this patient and have completed at least one if not all key elements of the E/M (history, physical exam, and MDM). Additional findings are as noted. Pertinent Comments     Primary Care Physician: No primary care provider on file. ED Triage Vitals   BP Temp Temp Source Heart Rate Resp SpO2 Height Weight   11/06/22 0437 11/06/22 0452 11/06/22 0452 11/06/22 0437 11/06/22 0437 11/06/22 0437 11/06/22 0437 11/06/22 0437   129/82 97.9 °F (36.6 °C) Oral 79 16 99 % 5' 9\" (1.753 m) 165 lb (74.8 kg)          This is a 24 y.o. male who presents to the Emergency Department headache, nausea, and vomiting for ~ 1 week. Reports he has had a similar headache in the past but it has not lasted as long. Not thunderclap in onset. No neck pain. Has been having nausea and vomiting but no abdominal pain or bowel changes. No body aches. No sick contacts. On exam well appeariing in no acute distress. NC/AT with PERRL and EOMI.  No neck stiffness or meningeal signs. Heart sounds regular and lungs clear to auscultation. Abdomen is soft and nontender. Patient is alert oriented x 4 with appropriate speech. No pronator drift. Bilateral upper and lower extremities with normal and symmetric strength, tone, and sensation. Normal coordination. Normal gait. Headache with n/v.  No red flag symptoms and no clinical suspicion of meningitis or SAH  CBC, BMP given prolonged vomiting  Symptomatic treratment  Patient tolerating po [place] for d/c       Critical Care: None     Ashley Robert MD  Attending Emergency Physician        Ashley Robert MD  11/09/22 [id number]